# Patient Record
Sex: MALE | Race: WHITE | URBAN - METROPOLITAN AREA
[De-identification: names, ages, dates, MRNs, and addresses within clinical notes are randomized per-mention and may not be internally consistent; named-entity substitution may affect disease eponyms.]

---

## 2018-09-17 ENCOUNTER — OFFICE VISIT (OUTPATIENT)
Dept: URBAN - METROPOLITAN AREA CLINIC 17 | Facility: CLINIC | Age: 68
End: 2018-09-17
Payer: MEDICARE

## 2018-09-17 PROCEDURE — 99213 OFFICE O/P EST LOW 20 MIN: CPT | Performed by: OPHTHALMOLOGY

## 2018-09-17 RX ORDER — NEPAFENAC 1 MG/ML
0.1 % SUSPENSION/ DROPS OPHTHALMIC
Qty: 1 | Refills: 1 | Status: INACTIVE
Start: 2018-09-17 | End: 2018-11-26

## 2018-09-17 RX ORDER — DORZOLAMIDE HYDROCHLORIDE AND TIMOLOL MALEATE 20; 5 MG/ML; MG/ML
SOLUTION/ DROPS OPHTHALMIC
Qty: 1 | Refills: 6 | Status: INACTIVE
Start: 2018-09-17 | End: 2018-11-05

## 2018-09-17 ASSESSMENT — KERATOMETRY
OD: 47.00
OS: 46.50

## 2018-09-17 ASSESSMENT — INTRAOCULAR PRESSURE
OS: 18
OD: 16

## 2018-09-17 ASSESSMENT — VISUAL ACUITY: OD: 20/40

## 2018-09-17 NOTE — IMPRESSION/PLAN
Impression: Type 2 diab w moderate nonprlf diab rtnop w macular edema, right eye: C05.3558. OD. Condition: stable. Plan: Pt says he's having a difficult time getting bromfenac from pharmacies, several places have said they don't have it. Recommend patient begin Nevanac BID OD (erx'd). May need to change to ketorolac QID if nevanac or bromfenac are not available. Consider using mail-order.

## 2018-09-17 NOTE — IMPRESSION/PLAN
Impression: Primary open-angle glaucoma, bilateral, moderate stage: J53.9511. IOP stable today OU Plan: Continue dorzolamide/timolol BID OU. IOP's are stable. Will continue to monitor IOP's on current regimen.

## 2018-09-17 NOTE — IMPRESSION/PLAN
Impression: Age-related nuclear cataract, left eye: H25.12. Vision: vision affected. s/p RK OU, amblyopic OS since childhood per patient Plan: Discussed diagnosis in detail with patient. Patient defers surgical treatment at this time due to amblyopia. The patient will monitor vision changes and contact us when he is ready to proceed with surgery.

## 2018-12-17 ENCOUNTER — OFFICE VISIT (OUTPATIENT)
Dept: URBAN - METROPOLITAN AREA CLINIC 17 | Facility: CLINIC | Age: 68
End: 2018-12-17
Payer: MEDICARE

## 2018-12-17 DIAGNOSIS — H25.12 AGE-RELATED NUCLEAR CATARACT, LEFT EYE: ICD-10-CM

## 2018-12-17 DIAGNOSIS — H40.1132 PRIMARY OPEN-ANGLE GLAUCOMA, BILATERAL, MODERATE STAGE: Primary | ICD-10-CM

## 2018-12-17 DIAGNOSIS — H17.89 OTHER CORNEAL SCARS: ICD-10-CM

## 2018-12-17 DIAGNOSIS — H21.542 POSTERIOR SYNECHIAE (IRIS), LEFT EYE: ICD-10-CM

## 2018-12-17 DIAGNOSIS — E11.9 TYPE 2 DIABETES MELLITUS W/O COMPLICATION: ICD-10-CM

## 2018-12-17 PROCEDURE — 99213 OFFICE O/P EST LOW 20 MIN: CPT | Performed by: OPHTHALMOLOGY

## 2018-12-17 RX ORDER — BROMFENAC SODIUM 0.7 MG/ML
0.07 % SOLUTION/ DROPS OPHTHALMIC
Qty: 3 | Refills: 4 | Status: INACTIVE
Start: 2018-12-17 | End: 2019-12-04

## 2018-12-17 RX ORDER — DORZOLAMIDE HYDROCHLORIDE AND TIMOLOL MALEATE 20; 5 MG/ML; MG/ML
SOLUTION/ DROPS OPHTHALMIC
Qty: 10 | Refills: 7 | Status: INACTIVE
Start: 2018-12-17 | End: 2019-04-01

## 2018-12-17 ASSESSMENT — INTRAOCULAR PRESSURE
OS: 16
OD: 13
OS: 18
OD: 14

## 2018-12-17 NOTE — IMPRESSION/PLAN
Impression: Age-related nuclear cataract, left eye: H25.12. Condition: established, worsening. Plan: Discussed diagnosis in detail with patient. Discussed treatment options with patient. No treatment is required at this time.

## 2018-12-17 NOTE — IMPRESSION/PLAN
Impression: Primary open-angle glaucoma, bilateral, moderate stage: N25.5485. IOP stable today OU Plan: Continue dorzolamide/timolol BID OU. IOP's are stable. Will continue to monitor IOP's on current regimen.

## 2018-12-17 NOTE — IMPRESSION/PLAN
Impression: Type 2 diabetes mellitus w/o complication: M96.4. OS. Condition: stable. Vision: vision not affected. Plan: Discussed diagnosis in detail with patient. No treatment is required at this time based on exam and OCT. Will continue to observe condition and or symptoms.

## 2018-12-17 NOTE — IMPRESSION/PLAN
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 condition, stable Plan: Discussed diagnosis in detail with patient. No treatment is required at this time. Will continue to observe condition and or symptoms.

## 2018-12-17 NOTE — IMPRESSION/PLAN
Impression: Type 2 diab w moderate nonprlf diab rtnop w macular edema, right eye: A71.9452. OD. Condition: stable. Plan: Discussed diagnosis in detail with patient. Advised patient of condition. Current therapy is best for patient.  Cont with Prolensa QD OD

## 2019-04-01 ENCOUNTER — OFFICE VISIT (OUTPATIENT)
Dept: URBAN - METROPOLITAN AREA CLINIC 17 | Facility: CLINIC | Age: 69
End: 2019-04-01
Payer: MEDICARE

## 2019-04-01 PROCEDURE — 99213 OFFICE O/P EST LOW 20 MIN: CPT | Performed by: OPHTHALMOLOGY

## 2019-04-01 RX ORDER — DORZOLAMIDE HYDROCHLORIDE AND TIMOLOL MALEATE 20; 5 MG/ML; MG/ML
SOLUTION/ DROPS OPHTHALMIC
Qty: 10 | Refills: 7 | Status: INACTIVE
Start: 2019-04-01 | End: 2020-04-13

## 2019-04-01 RX ORDER — OFLOXACIN 3 MG/ML
0.3 % SOLUTION/ DROPS OPHTHALMIC
Qty: 1 | Refills: 1 | Status: INACTIVE
Start: 2019-04-01 | End: 2019-05-08

## 2019-04-01 RX ORDER — PREDNISOLONE ACETATE 10 MG/ML
1 % SUSPENSION/ DROPS OPHTHALMIC
Qty: 1 | Refills: 1 | Status: INACTIVE
Start: 2019-04-01 | End: 2019-05-08

## 2019-04-01 ASSESSMENT — INTRAOCULAR PRESSURE
OD: 11
OS: 13

## 2019-04-01 NOTE — IMPRESSION/PLAN
Impression: Age-related nuclear cataract, left eye: H25.12. Vision: vision affected. Symptoms: could improve with surgery. Plan: Cataract accounts for patient's complaints. Reviewed risks, benefits, and procedure. Patient desires surgery, schedule ce/iol OS, RL2, discussed lens options, distance refractive target, patient is clear for surgery in [place] [id number]. Recommend patient begin using Prolensa QD OS beginning 1 week prior to sx.

## 2019-04-01 NOTE — IMPRESSION/PLAN
Impression: Primary open-angle glaucoma, bilateral, moderate stage: C57.9870. IOP stable today OU Plan: IOP's are stable on current regimen. Continue dorzolamide/timolol BID OU. Will continue to monitor IOP's. Keep scheduled appt with Dr. Lyric Smith for retina consult due to DM (continue Prolensa QD OD).

## 2019-04-02 ENCOUNTER — OFFICE VISIT (OUTPATIENT)
Dept: URBAN - METROPOLITAN AREA CLINIC 17 | Facility: CLINIC | Age: 69
End: 2019-04-02
Payer: MEDICARE

## 2019-04-02 DIAGNOSIS — E10.9 TYPE 1 DIABETES MELLITUS W/O COMPLICATION: ICD-10-CM

## 2019-04-02 PROCEDURE — 92134 CPTRZ OPH DX IMG PST SGM RTA: CPT | Performed by: OPHTHALMOLOGY

## 2019-04-02 PROCEDURE — 99214 OFFICE O/P EST MOD 30 MIN: CPT | Performed by: OPHTHALMOLOGY

## 2019-04-02 PROCEDURE — 67028 INJECTION EYE DRUG: CPT | Performed by: OPHTHALMOLOGY

## 2019-04-02 ASSESSMENT — INTRAOCULAR PRESSURE
OD: 16
OS: 16

## 2019-04-02 NOTE — IMPRESSION/PLAN
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 Plan: OCT today with mild DME. Recommend tx with Avastin. Pt would like to proceed. Consent obtained. Will proceed with Avastin #1 today. F/u 5/13/19 DFE/OCT/poss avastin inj (pt will be out of town, returning May 30th). Emphasized blood sugar, blood pressure, and lipid control.

## 2019-04-02 NOTE — IMPRESSION/PLAN
Impression: Type 1 diabetes mellitus w/o complication: P75.6. OS. Plan: No DME or NVE on exam. Discussed risks of progression. Emphasized blood sugar, blood pressure, and lipid control.

## 2019-05-08 ENCOUNTER — PRE-OPERATIVE VISIT (OUTPATIENT)
Dept: URBAN - METROPOLITAN AREA CLINIC 17 | Facility: CLINIC | Age: 69
End: 2019-05-08
Payer: MEDICARE

## 2019-05-08 RX ORDER — PREDNISOLONE ACETATE 10 MG/ML
1 % SUSPENSION/ DROPS OPHTHALMIC
Qty: 1 | Refills: 1 | Status: INACTIVE
Start: 2019-05-08 | End: 2019-12-04

## 2019-05-08 RX ORDER — OFLOXACIN 3 MG/ML
0.3 % SOLUTION/ DROPS OPHTHALMIC
Qty: 1 | Refills: 1 | Status: INACTIVE
Start: 2019-05-08 | End: 2019-12-04

## 2019-05-08 ASSESSMENT — PACHYMETRY
OS: 2.52
OS: 21.21
OD: 4.58
OD: 21.91

## 2019-05-13 ENCOUNTER — OFFICE VISIT (OUTPATIENT)
Dept: URBAN - METROPOLITAN AREA CLINIC 17 | Facility: CLINIC | Age: 69
End: 2019-05-13
Payer: MEDICARE

## 2019-05-13 PROCEDURE — 67028 INJECTION EYE DRUG: CPT | Performed by: OPHTHALMOLOGY

## 2019-05-13 PROCEDURE — 99214 OFFICE O/P EST MOD 30 MIN: CPT | Performed by: OPHTHALMOLOGY

## 2019-05-13 PROCEDURE — 92134 CPTRZ OPH DX IMG PST SGM RTA: CPT | Performed by: OPHTHALMOLOGY

## 2019-05-13 NOTE — IMPRESSION/PLAN
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 Plan: S/p  JOHAN  OD last 04/02/19 (6wks ago) OCT today with improved but persistent mild DME. Rec completion series of Avastin inj's. Pt would like to proceed. R/B/A d/w pt. Consent obtained. Administered Avastin inj #2/3 OD today. F/u 4 wks #3/3 Avastin OD (no oct/dfe). Emphasized blood sugar, blood pressure, and lipid control.

## 2019-05-13 NOTE — IMPRESSION/PLAN
Impression: Age-related nuclear cataract, left eye: H25.12.  Plan: As sched for CE OS with Dr. Anitra Poole

## 2019-06-06 ENCOUNTER — SURGERY (OUTPATIENT)
Dept: URBAN - METROPOLITAN AREA SURGERY 7 | Facility: SURGERY | Age: 69
End: 2019-06-06
Payer: MEDICARE

## 2019-06-06 PROCEDURE — 66984 XCAPSL CTRC RMVL W/O ECP: CPT | Performed by: OPHTHALMOLOGY

## 2019-06-07 ENCOUNTER — POST-OPERATIVE VISIT (OUTPATIENT)
Dept: URBAN - METROPOLITAN AREA CLINIC 17 | Facility: CLINIC | Age: 69
End: 2019-06-07

## 2019-06-07 PROCEDURE — 99024 POSTOP FOLLOW-UP VISIT: CPT | Performed by: OPTOMETRIST

## 2019-06-07 ASSESSMENT — INTRAOCULAR PRESSURE
OS: 19
OD: 17

## 2019-06-10 ENCOUNTER — PROCEDURE (OUTPATIENT)
Dept: URBAN - METROPOLITAN AREA CLINIC 17 | Facility: CLINIC | Age: 69
End: 2019-06-10
Payer: MEDICARE

## 2019-06-10 DIAGNOSIS — E11.3311 TYPE 2 DIAB WITH MOD NONP RTNOP WITH MACULAR EDEMA, R EYE: Primary | ICD-10-CM

## 2019-06-10 PROCEDURE — 67028 INJECTION EYE DRUG: CPT | Performed by: OPHTHALMOLOGY

## 2019-06-14 ENCOUNTER — POST-OPERATIVE VISIT (OUTPATIENT)
Dept: URBAN - METROPOLITAN AREA CLINIC 17 | Facility: CLINIC | Age: 69
End: 2019-06-14

## 2019-06-14 PROCEDURE — 99024 POSTOP FOLLOW-UP VISIT: CPT | Performed by: OPTOMETRIST

## 2019-06-14 ASSESSMENT — INTRAOCULAR PRESSURE
OS: 14
OD: 13

## 2019-06-14 ASSESSMENT — VISUAL ACUITY: OS: 20/80+1

## 2019-07-03 ENCOUNTER — POST-OPERATIVE VISIT (OUTPATIENT)
Dept: URBAN - METROPOLITAN AREA CLINIC 17 | Facility: CLINIC | Age: 69
End: 2019-07-03

## 2019-07-03 DIAGNOSIS — Z09 ENCNTR FOR F/U EXAM AFT TRTMT FOR COND OTH THAN MALIG NEOPLM: Primary | ICD-10-CM

## 2019-07-03 PROCEDURE — 99024 POSTOP FOLLOW-UP VISIT: CPT | Performed by: OPTOMETRIST

## 2019-07-03 RX ORDER — KETOROLAC TROMETHAMINE 5 MG/ML
0.5 % SOLUTION OPHTHALMIC
Qty: 1 | Refills: 1 | Status: INACTIVE
Start: 2019-07-03 | End: 2019-12-04

## 2019-07-03 ASSESSMENT — INTRAOCULAR PRESSURE
OD: 11
OS: 10

## 2019-07-03 ASSESSMENT — VISUAL ACUITY: OS: 20/70+1

## 2019-07-08 ENCOUNTER — OFFICE VISIT (OUTPATIENT)
Dept: URBAN - METROPOLITAN AREA CLINIC 17 | Facility: CLINIC | Age: 69
End: 2019-07-08
Payer: MEDICARE

## 2019-07-08 PROCEDURE — 99213 OFFICE O/P EST LOW 20 MIN: CPT | Performed by: OPHTHALMOLOGY

## 2019-07-08 PROCEDURE — 92134 CPTRZ OPH DX IMG PST SGM RTA: CPT | Performed by: OPHTHALMOLOGY

## 2019-07-08 ASSESSMENT — INTRAOCULAR PRESSURE
OS: 10
OD: 14

## 2019-07-08 NOTE — IMPRESSION/PLAN
Impression: Type 1 diabetes mellitus w/o complication: W93.3. OS. Plan: No DME or NVE on exam. Discussed risks of progression. Emphasized blood sugar, blood pressure, and lipid control.

## 2019-07-08 NOTE — IMPRESSION/PLAN
Impression: Type 1 diab with mild nonp rtnop with macular edema, r eye: A24.1696. OD. Plan: S/p  JOHAN X3 OD last 6/10/19 (8 wks ago) OCT today with no significant DME. Rec Observation at this. Patient is okay for a new refraction within the next 2 wks. Emphasized blood sugar, blood pressure, and lipid control.

## 2019-08-20 ENCOUNTER — OFFICE VISIT (OUTPATIENT)
Dept: URBAN - METROPOLITAN AREA CLINIC 17 | Facility: CLINIC | Age: 69
End: 2019-08-20
Payer: MEDICARE

## 2019-08-20 DIAGNOSIS — H33.102 RETINOSCHISIS OF LEFT EYE: ICD-10-CM

## 2019-08-20 PROCEDURE — 99214 OFFICE O/P EST MOD 30 MIN: CPT | Performed by: OPHTHALMOLOGY

## 2019-08-20 PROCEDURE — 67028 INJECTION EYE DRUG: CPT | Performed by: OPHTHALMOLOGY

## 2019-08-20 PROCEDURE — 92134 CPTRZ OPH DX IMG PST SGM RTA: CPT | Performed by: OPHTHALMOLOGY

## 2019-08-20 ASSESSMENT — INTRAOCULAR PRESSURE
OD: 14
OS: 11

## 2019-08-20 NOTE — IMPRESSION/PLAN
Impression: Type 1 diab with mild nonp rtnop with macular edema, r eye: A22.4536. OD. Plan: S/p JOHAN x3 on 6/10/19 (10 wks ago). OCT today w/mild incr'd DME. Va stable @ 20/40. Rec JOHAN today. Pt would like to proceed. R/B/A d/w pt. Consent obtained. Administered OJHAN OD today. F/u 6-8 wks dfe/oct/poss JOHAN. Emphasized blood sugar, blood pressure, and lipid control.

## 2019-08-20 NOTE — IMPRESSION/PLAN
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 Plan: On exam, IT bullous retinoschisis. No outer/inner holes on . RD precautions d/w pt. Obtained optos photos today for baseline. Monitor.

## 2019-10-09 ENCOUNTER — OFFICE VISIT (OUTPATIENT)
Dept: URBAN - METROPOLITAN AREA CLINIC 17 | Facility: CLINIC | Age: 69
End: 2019-10-09
Payer: MEDICARE

## 2019-10-09 DIAGNOSIS — E10.3211 TYPE 1 DIAB WITH MILD NONP RTNOP WITH MACULAR EDEMA, R EYE: Primary | ICD-10-CM

## 2019-10-09 PROCEDURE — 92134 CPTRZ OPH DX IMG PST SGM RTA: CPT | Performed by: OPHTHALMOLOGY

## 2019-10-09 PROCEDURE — 67028 INJECTION EYE DRUG: CPT | Performed by: OPHTHALMOLOGY

## 2019-10-09 PROCEDURE — 99214 OFFICE O/P EST MOD 30 MIN: CPT | Performed by: OPHTHALMOLOGY

## 2019-10-09 ASSESSMENT — INTRAOCULAR PRESSURE
OD: 16
OS: 16

## 2019-10-09 NOTE — IMPRESSION/PLAN
Impression: Type 1 diab with mild nonp rtnop with macular edema, r eye: A39.4350. OD. Plan: S/p LIVA on 8/20/19 (7wks ago). OCT today w/persistent DME. Rec JOHAN today. Pt would like to proceed. R/B/A d/w pt. Consent obtained. Administered JOHAN OD today. F/u 6-8 wks dfe/oct/poss JOHAN. Emphasized blood sugar, blood pressure, and lipid control.

## 2019-12-04 ENCOUNTER — OFFICE VISIT (OUTPATIENT)
Dept: URBAN - METROPOLITAN AREA CLINIC 17 | Facility: CLINIC | Age: 69
End: 2019-12-04
Payer: MEDICARE

## 2019-12-04 PROCEDURE — 92134 CPTRZ OPH DX IMG PST SGM RTA: CPT | Performed by: OPHTHALMOLOGY

## 2019-12-04 PROCEDURE — 67028 INJECTION EYE DRUG: CPT | Performed by: OPHTHALMOLOGY

## 2019-12-04 PROCEDURE — 99214 OFFICE O/P EST MOD 30 MIN: CPT | Performed by: OPHTHALMOLOGY

## 2019-12-04 ASSESSMENT — INTRAOCULAR PRESSURE
OS: 14
OD: 18

## 2019-12-04 NOTE — IMPRESSION/PLAN
Impression: Type 1 diab with mild nonp rtnop with macular edema, r eye: Z93.3053. OD. Plan: S/p LIVA on 10/9/19 (8wks ago). OCT today w/persistent DME. Rec treatment w/[facility]. Pt would like to proceed. R/B/A d/w pt. Consent obtained. Administered JOSUÉ OD today. F/u 5 wks dfe/oct/poss JOHAN. Emphasized blood sugar, blood pressure, and lipid control.  *Auth submitted for [facility] today

## 2020-01-07 ENCOUNTER — OFFICE VISIT (OUTPATIENT)
Dept: URBAN - METROPOLITAN AREA CLINIC 17 | Facility: CLINIC | Age: 70
End: 2020-01-07
Payer: MEDICARE

## 2020-01-07 PROCEDURE — 99214 OFFICE O/P EST MOD 30 MIN: CPT | Performed by: OPHTHALMOLOGY

## 2020-01-07 PROCEDURE — 92134 CPTRZ OPH DX IMG PST SGM RTA: CPT | Performed by: OPHTHALMOLOGY

## 2020-01-07 ASSESSMENT — INTRAOCULAR PRESSURE
OS: 13
OD: 13

## 2020-01-07 NOTE — IMPRESSION/PLAN
Impression: Type 1 diab with mild nonp rtnop with macular edema, r eye: D27.5268. OD. Plan: S/p JOSUÉ 12/4/19 (5wks ago). OCT today w/ improved DME, minimal remaining. Va 20/25. Rec observation for now, will treat if progression next visit. Emphasized blood sugar, blood pressure, and lipid control. F/u 6-8wks dfe/oct.